# Patient Record
Sex: FEMALE | Race: BLACK OR AFRICAN AMERICAN | NOT HISPANIC OR LATINO | Employment: FULL TIME | URBAN - METROPOLITAN AREA
[De-identification: names, ages, dates, MRNs, and addresses within clinical notes are randomized per-mention and may not be internally consistent; named-entity substitution may affect disease eponyms.]

---

## 2023-06-26 ENCOUNTER — OFFICE VISIT (OUTPATIENT)
Dept: PRIMARY CARE | Facility: CLINIC | Age: 28
End: 2023-06-26
Payer: COMMERCIAL

## 2023-06-26 VITALS
HEART RATE: 79 BPM | HEIGHT: 67 IN | TEMPERATURE: 97.9 F | OXYGEN SATURATION: 99 % | BODY MASS INDEX: 19.15 KG/M2 | DIASTOLIC BLOOD PRESSURE: 70 MMHG | SYSTOLIC BLOOD PRESSURE: 110 MMHG | RESPIRATION RATE: 16 BRPM | WEIGHT: 122 LBS

## 2023-06-26 DIAGNOSIS — M62.838 NECK MUSCLE SPASM: Primary | ICD-10-CM

## 2023-06-26 DIAGNOSIS — M54.2 NECK PAIN ON RIGHT SIDE: ICD-10-CM

## 2023-06-26 PROCEDURE — 99213 OFFICE O/P EST LOW 20 MIN: CPT | Performed by: NURSE PRACTITIONER

## 2023-06-26 RX ORDER — IPRATROPIUM BROMIDE AND ALBUTEROL SULFATE 2.5; .5 MG/3ML; MG/3ML
3 SOLUTION RESPIRATORY (INHALATION)
COMMUNITY
Start: 2022-10-20

## 2023-06-26 RX ORDER — DICLOFENAC SODIUM 75 MG/1
75 TABLET, DELAYED RELEASE ORAL 2 TIMES DAILY PRN
Qty: 60 TABLET | Refills: 0 | Status: SHIPPED | OUTPATIENT
Start: 2023-06-26 | End: 2023-08-25

## 2023-06-26 RX ORDER — METHOCARBAMOL 500 MG/1
500 TABLET, FILM COATED ORAL 4 TIMES DAILY PRN
Qty: 40 TABLET | Refills: 0 | Status: SHIPPED | OUTPATIENT
Start: 2023-06-26 | End: 2024-03-19 | Stop reason: ALTCHOICE

## 2023-06-26 RX ORDER — ALBUTEROL SULFATE 0.83 MG/ML
2.5 SOLUTION RESPIRATORY (INHALATION) EVERY 8 HOURS PRN
COMMUNITY
Start: 2022-09-13

## 2023-06-26 RX ORDER — ALBUTEROL SULFATE 1.25 MG/3ML
1.25 SOLUTION RESPIRATORY (INHALATION) EVERY 8 HOURS PRN
COMMUNITY
Start: 2020-03-21

## 2023-06-26 RX ORDER — ALBUTEROL SULFATE 90 UG/1
2 AEROSOL, METERED RESPIRATORY (INHALATION) EVERY 4 HOURS PRN
COMMUNITY
Start: 2022-09-13 | End: 2023-12-28 | Stop reason: SDUPTHER

## 2023-06-26 ASSESSMENT — ENCOUNTER SYMPTOMS
NECK PAIN: 1
HEADACHES: 1

## 2023-06-26 NOTE — PROGRESS NOTES
Subjective   Patient ID: Taylor Downs is a 27 y.o. female who is with chief complaint of pain on the right side of her neck.    HPI  Patient is a 27 y.o. female who CONSULTED AT [facility] today. Patient is with complaints of pain the right side of her neck. She states that condition started about 1 week ago with pain and spasm on the left side of her neck. She denies injury nor any other probable source of symptoms. She states the pain on the left side of her neck disappeared but then this morning the pain and spasm is now on her right side of the neck. She denies any other symptoms. She has taken OTC analgesics but not much relief.     Review of Systems  General: no weight loss, generally healthy, no fatigue  Head:  no headaches / sinus pain, no vertigo, no injury  Eyes: no diplopia, no tearing, no pain,   Ears: no change in hearing, no tinnitus, no bleeding, no vertigo  Mouth:  no dental difficulties, no gingival bleeding, no sore throat, no loss of sense of taste  Nose: no congestion, no  discharge, no bleeding, no obstruction, no loss of sense of smell  Neck: (+) pain and spasm on the right side of the neck,  no masses, no bruit  Pulmonary: no dyspnea, no wheezing, no hemoptysis, no cough  Cardiovascular: no chest pain, no palpitations, no syncope, no orthopnea  Gastrointestinal: no change in appetite, no dysphagia, no abdominal pains, no diarrhea, no emesis, no melena  Genito Urinary: no dysuria, no urinary urgency, no nocturia, no incontinence, no change in nature of urine  Musculoskeletal: no muscle ache, no joint pain, no limitation of range of motion, no paresthesia, no numbness  Constitutional: no fever, no chills, no night sweats    Objective   Physical Exam  General: ambulatory, in no acute distress  Head: normocephalic, no lesions  Neck: (+) direct tenderness on right side of the neck on the areas of the sternocleidomastoid and trapezius,  Musculoskeletal: no limitation of range  of motion, no paralysis, no deformity;   Extremities: full and equal peripheral pulses, no edema, < 2 seconds capillary refill on all toes    Assessment/Plan   Problem List Items Addressed This Visit    None  Visit Diagnoses       Neck muscle spasm    -  Primary    Relevant Medications    methocarbamol (Robaxin) 500 mg tablet    diclofenac (Voltaren) 75 mg EC tablet    Neck pain on right side        Relevant Medications    methocarbamol (Robaxin) 500 mg tablet    diclofenac (Voltaren) 75 mg EC tablet        DISCHARGE SUMMARY:   Patient was seen and examined. Diagnosis, treatment, treatment options, and possible complications of today's illness discussed and explained to patient. Patient to take medication/s associated with this visit.  Patient may use OTC menthol patches as needed for comfort. Advised stretching and warm up exercises as tolerated prior to more intense physical exertion / exercise. Reinforce stretching and exercises that strengthen core muscles.     Patient to come back in 4 - 7 days if needed for worsening symptoms. Patient verbalized understanding of plan of care.

## 2023-06-26 NOTE — PROGRESS NOTES
"Subjective   Patient ID: Taylor Downs is a 27 y.o. female who presents for Neck Pain.    Patient claims pain radiates down her back, but the left side don't hurt the right side is the worst. But patient claims it can radiate to either side.    Neck Pain   This is a new problem. The current episode started 1 to 4 weeks ago. The problem occurs constantly. The pain is associated with an unknown factor. The pain is present in the right side, occipital region and left side. The quality of the pain is described as shooting and aching. The pain is at a severity of 10/10. The pain is severe. The symptoms are aggravated by twisting, position and bending. The pain is Worse during the night. Stiffness is present All day. Associated symptoms include headaches. She has tried bed rest for the symptoms. The treatment provided no relief.        Review of Systems   Musculoskeletal:  Positive for neck pain.   Neurological:  Positive for headaches.       Objective   /70 (BP Location: Left arm, Patient Position: Sitting, BP Cuff Size: Adult)   Pulse 79   Temp 36.6 °C (97.9 °F) (Temporal)   Resp 16   Ht 1.689 m (5' 6.5\")   Wt 55.3 kg (122 lb)   SpO2 99%   BMI 19.40 kg/m²     Physical Exam    Assessment/Plan          "

## 2023-06-27 NOTE — PATIENT INSTRUCTIONS
DISCHARGE SUMMARY:   Patient was seen and examined. Diagnosis, treatment, treatment options, and possible complications of today's illness discussed and explained to patient. Patient to take medication/s associated with this visit.  Patient may use OTC menthol patches as needed for comfort. Advised stretching and warm up exercises as tolerated prior to more intense physical exertion / exercise. Reinforce stretching and exercises that strengthen core muscles.     Patient to come back in 4 - 7 days if needed for worsening symptoms. Patient verbalized understanding of plan of care.

## 2023-12-15 ENCOUNTER — OFFICE VISIT (OUTPATIENT)
Dept: PRIMARY CARE | Facility: CLINIC | Age: 28
End: 2023-12-15
Payer: COMMERCIAL

## 2023-12-15 VITALS
RESPIRATION RATE: 16 BRPM | HEIGHT: 67 IN | DIASTOLIC BLOOD PRESSURE: 67 MMHG | OXYGEN SATURATION: 98 % | HEART RATE: 89 BPM | BODY MASS INDEX: 19.53 KG/M2 | TEMPERATURE: 98.2 F | WEIGHT: 124.4 LBS | SYSTOLIC BLOOD PRESSURE: 112 MMHG

## 2023-12-15 DIAGNOSIS — R06.2 WHEEZING: ICD-10-CM

## 2023-12-15 DIAGNOSIS — J45.20 MILD INTERMITTENT ASTHMA WITHOUT COMPLICATION (HHS-HCC): Primary | ICD-10-CM

## 2023-12-15 DIAGNOSIS — R06.09 DYSPNEA ON EXERTION: ICD-10-CM

## 2023-12-15 PROCEDURE — 99213 OFFICE O/P EST LOW 20 MIN: CPT | Performed by: NURSE PRACTITIONER

## 2023-12-15 RX ORDER — PREDNISONE 10 MG/1
TABLET ORAL
Qty: 30 TABLET | Refills: 0 | Status: SHIPPED | OUTPATIENT
Start: 2023-12-15 | End: 2023-12-25

## 2023-12-15 ASSESSMENT — PATIENT HEALTH QUESTIONNAIRE - PHQ9
SUM OF ALL RESPONSES TO PHQ9 QUESTIONS 1 AND 2: 0
2. FEELING DOWN, DEPRESSED OR HOPELESS: NOT AT ALL
1. LITTLE INTEREST OR PLEASURE IN DOING THINGS: NOT AT ALL

## 2023-12-15 NOTE — PROGRESS NOTES
"Subjective   Patient ID: Taylor Downs is a 28 y.o. female who presents for Shortness of Breath.    HPI     Symptoms: shortness of breath,fatigue from treatments, heaviness on chest.    Length of symptoms: Sx started on Monday 12/11/2023    OTC: pt did some albuterol breathing treatments with mild help.    Related information:    Review of Systems    Objective   /67 Comment: auto  Pulse 89   Temp 36.8 °C (98.2 °F) (Temporal)   Resp 16   Ht 1.689 m (5' 6.5\")   Wt 56.4 kg (124 lb 6.4 oz)   SpO2 98%   BMI 19.78 kg/m²     Physical Exam    Assessment/Plan          "

## 2023-12-15 NOTE — PROGRESS NOTES
Subjective   Patient ID: Taylor Downs is a 28 y.o. female who is with chief complaint of shortness of breath.    HPI  Patient is a 28 y.o. female who CONSULTED AT [facility] today. Patient is with complaints of shortness of breath and fatigue. Patient is with her mother who helped provide information for HPI. Patient denies having nasal congestion, nasal discharge, headache / sinus pain, sore throat, cough, muscle ache, loss of sense of taste, loss of sense of smell, diarrhea, chills nor fever. Patient states that present condition started about 4 days ago. Patient denies history of recent travel, exposure to person/people who tested positive for COVID 19, nor exposure to person/people with flu like symptoms.    Review of Systems  General: no weight loss, generally healthy, (+) fatigue  Head:  no headaches / sinus pain, no vertigo, no injury  Eyes: no diplopia, no tearing, no pain,   Ears: no change in hearing, no tinnitus, no bleeding, no vertigo  Mouth:  no dental difficulties, no gingival bleeding, no sore throat, no loss of sense of taste  Nose: no congestion, no  discharge, no bleeding, no obstruction, no loss of sense of smell  Neck: no stiffness, no pain, no tenderness, no masses, no bruit  Pulmonary: (+) dyspnea on exertion, no wheezing, no hemoptysis, no cough  Cardiovascular: no chest pain, no palpitations, no syncope, no orthopnea  Gastrointestinal: no change in appetite, no dysphagia, no abdominal pains, no diarrhea, no emesis, no melena  Genito Urinary: no dysuria, no urinary urgency, no nocturia, no incontinence, no change in nature of urine  Musculoskeletal: no muscle ache, no joint pain, no limitation of range of motion, no paresthesia, no numbness  Constitutional: no fever, no chills, no night sweats    Objective   Physical Exam  General: ambulatory, in no acute distress  Head: normocephalic, no lesions, no sinus tenderness  Eyes: pink palpebral conjunctiva, anicteric sclerae,  PATRIZIA HENNING's full  Ears: clear external auditory canals, no ear discharge, no bleeding from the ears, tympanic membrane intact  Nose: normal looking nasal mucosa, no nasal discharge, no bleeding, no obstruction  Throat: no erythema, no exudate on posterior pharyngeal wall, no lesion  Neck: supple, no masses, no bruits, no CLADP  Chest: symmetrical chest expansion, no lagging, no retractions, (+) harsh breath sounds, (+) diffuse wheezing on both lung fields, no rales,   Extremities: full and equal peripheral pulses, no edema,     Assessment/Plan   Problem List Items Addressed This Visit    None  Visit Diagnoses         Codes    Mild intermittent asthma without complication    -  Primary J45.20    Relevant Medications    predniSONE (Deltasone) 10 mg tablet    Wheezing     R06.2    Relevant Medications    predniSONE (Deltasone) 10 mg tablet    Dyspnea on exertion     R06.09    Relevant Medications    predniSONE (Deltasone) 10 mg tablet        DISCHARGE SUMMARY:   Patient was seen and examined. Diagnosis, treatment, treatment options, and possible complications of today's illness discussed and explained to patient and her mother. Patient to take medication/s associated with this visit. Patient may continue taking her bronchodilator inhaler. Advised to increase oral fluid intake. Advised steam inhalation as needed to relieve congestion. Advised to come back if with worsening or persistent symptoms. Patient verbalized understanding of plan of care.    Patient to come back in 7 - 10 days if needed for worsening symptoms.           CHELSEA Parsons-CNP 12/15/23 5:34 PM

## 2023-12-17 ASSESSMENT — ENCOUNTER SYMPTOMS
LOSS OF SENSATION IN FEET: 0
OCCASIONAL FEELINGS OF UNSTEADINESS: 0
DEPRESSION: 0

## 2023-12-18 NOTE — PATIENT INSTRUCTIONS
DISCHARGE SUMMARY:   Patient was seen and examined. Diagnosis, treatment, treatment options, and possible complications of today's illness discussed and explained to patient and her mother. Patient to take medication/s associated with this visit. Patient may continue taking her bronchodilator inhaler. Advised to increase oral fluid intake. Advised steam inhalation as needed to relieve congestion. Advised to come back if with worsening or persistent symptoms. Patient verbalized understanding of plan of care.    Patient to come back in 7 - 10 days if needed for worsening symptoms.

## 2023-12-28 ENCOUNTER — OFFICE VISIT (OUTPATIENT)
Dept: PRIMARY CARE | Facility: CLINIC | Age: 28
End: 2023-12-28
Payer: COMMERCIAL

## 2023-12-28 VITALS
OXYGEN SATURATION: 95 % | SYSTOLIC BLOOD PRESSURE: 111 MMHG | DIASTOLIC BLOOD PRESSURE: 53 MMHG | RESPIRATION RATE: 16 BRPM | TEMPERATURE: 97.4 F | HEIGHT: 67 IN | BODY MASS INDEX: 19.49 KG/M2 | HEART RATE: 102 BPM | WEIGHT: 124.2 LBS

## 2023-12-28 DIAGNOSIS — J45.20 MILD INTERMITTENT ASTHMA WITHOUT COMPLICATION (HHS-HCC): Primary | ICD-10-CM

## 2023-12-28 DIAGNOSIS — R06.02 SHORTNESS OF BREATH: ICD-10-CM

## 2023-12-28 PROCEDURE — 99213 OFFICE O/P EST LOW 20 MIN: CPT | Performed by: NURSE PRACTITIONER

## 2023-12-28 RX ORDER — METHYLPREDNISOLONE 4 MG/1
TABLET ORAL
Qty: 21 TABLET | Refills: 0 | Status: SHIPPED | OUTPATIENT
Start: 2023-12-28 | End: 2024-01-04

## 2023-12-28 RX ORDER — ALBUTEROL SULFATE 90 UG/1
2 AEROSOL, METERED RESPIRATORY (INHALATION) EVERY 4 HOURS PRN
Qty: 54 G | Refills: 1 | Status: SHIPPED | OUTPATIENT
Start: 2023-12-28

## 2023-12-28 ASSESSMENT — PATIENT HEALTH QUESTIONNAIRE - PHQ9
2. FEELING DOWN, DEPRESSED OR HOPELESS: NOT AT ALL
1. LITTLE INTEREST OR PLEASURE IN DOING THINGS: NOT AT ALL
SUM OF ALL RESPONSES TO PHQ9 QUESTIONS 1 AND 2: 0

## 2023-12-28 ASSESSMENT — ENCOUNTER SYMPTOMS
HEADACHES: 0
WEAKNESS: 0
COUGH: 0
SHORTNESS OF BREATH: 1
CHEST TIGHTNESS: 1
CHILLS: 0
SINUS PRESSURE: 0
FATIGUE: 0
SORE THROAT: 0
WHEEZING: 0
NUMBNESS: 0
PALPITATIONS: 0
DIZZINESS: 0

## 2023-12-28 NOTE — PROGRESS NOTES
"Subjective   Patient ID: Taylor Downs is a 28 y.o. female who presents for Asthma.    HPI Patient is in office  with asthma flare up, Sx started 12/27/2023 pt tried her ventolin with no help.     28 year old female (PMH: asthma) presents today complaining of an asthma flare. She was seen on 12/15/2023 and treated with prednisone. She was feeling better, until yesterday, her symptoms returned. She is feeling short of breath and her chest is feeling heavy. She has been using her Ventolin with little relief. She denies any cough. No fever/chills. She denies smoking/vaping. She has been using ipratropium/albuterol as her maintenance medication.     Review of Systems   Constitutional:  Negative for chills and fatigue.   HENT:  Negative for congestion, sinus pressure and sore throat.    Respiratory:  Positive for chest tightness and shortness of breath. Negative for cough and wheezing.    Cardiovascular:  Negative for chest pain and palpitations.   Allergic/Immunologic: Positive for environmental allergies and food allergies.   Neurological:  Negative for dizziness, weakness, numbness and headaches.       Objective   /53 Comment: auto  Pulse 102   Temp 36.3 °C (97.4 °F) (Temporal)   Resp 16   Ht 1.689 m (5' 6.5\")   Wt 56.3 kg (124 lb 3.2 oz)   SpO2 95%   BMI 19.75 kg/m²     Physical Exam  Vitals and nursing note reviewed.   Constitutional:       General: She is not in acute distress.     Appearance: Normal appearance.   HENT:      Right Ear: Tympanic membrane normal.      Left Ear: Tympanic membrane normal.      Nose: No congestion.      Mouth/Throat:      Pharynx: No oropharyngeal exudate or posterior oropharyngeal erythema.   Eyes:      Conjunctiva/sclera: Conjunctivae normal.   Cardiovascular:      Rate and Rhythm: Normal rate and regular rhythm.      Heart sounds: Normal heart sounds.   Pulmonary:      Effort: Pulmonary effort is normal.      Breath sounds: Normal breath sounds. No wheezing, rhonchi or " rales.   Skin:     General: Skin is warm and dry.   Neurological:      Mental Status: She is alert.   Psychiatric:         Mood and Affect: Mood normal.         Behavior: Behavior normal.         Assessment/Plan   Problem List Items Addressed This Visit    None  Visit Diagnoses         Codes    Mild intermittent asthma without complication    -  Primary J45.20    Relevant Medications    methylPREDNISolone (Medrol Dospak) 4 mg tablets    Ventolin HFA 90 mcg/actuation inhaler    Body mass index (BMI) of 19.0 to 19.9 in adult     Z68.1    Shortness of breath     R06.02        Will treat with Medrol dose galina, ventolin refilled today. [place] of Trelegy Ellipta (100mcg/62.5/25 mcg per blister) 1 puff daily for asthma maintenance treatment provided today. Follow up in 1 week for recheck, or sooner with any additional concerns. If any worsening symptoms, chest pains, trouble breathing, proceed to ER for further evaluation/treatment, or call 911.

## 2023-12-28 NOTE — PATIENT INSTRUCTIONS
Today you were seen for an asthma exacerbation.  Please start medications as directed.  Follow-up in 1 week for recheck, or sooner with any additional concerns.  If developing any worsening symptoms, chest pain or trouble breathing, proceed to emergency department or call 911.

## 2024-01-03 ASSESSMENT — ENCOUNTER SYMPTOMS
LEG PAIN: 0
ABDOMINAL PAIN: 0
CLAUDICATION: 0
WHEEZING: 1
NECK PAIN: 0
SWOLLEN GLANDS: 0
PND: 0
SYNCOPE: 0
SPUTUM PRODUCTION: 0
RHINORRHEA: 0
VOMITING: 0
FEVER: 0
ORTHOPNEA: 0
SHORTNESS OF BREATH: 1
HEMOPTYSIS: 0
SORE THROAT: 0
HEADACHES: 0

## 2024-01-04 ENCOUNTER — APPOINTMENT (OUTPATIENT)
Dept: CARDIOLOGY | Facility: HOSPITAL | Age: 29
End: 2024-01-04
Payer: COMMERCIAL

## 2024-01-04 ENCOUNTER — APPOINTMENT (OUTPATIENT)
Dept: PRIMARY CARE | Facility: CLINIC | Age: 29
End: 2024-01-04
Payer: COMMERCIAL

## 2024-01-04 ENCOUNTER — APPOINTMENT (OUTPATIENT)
Dept: RADIOLOGY | Facility: HOSPITAL | Age: 29
End: 2024-01-04
Payer: COMMERCIAL

## 2024-01-04 ENCOUNTER — HOSPITAL ENCOUNTER (EMERGENCY)
Facility: HOSPITAL | Age: 29
Discharge: HOME | End: 2024-01-04
Attending: EMERGENCY MEDICINE
Payer: COMMERCIAL

## 2024-01-04 VITALS
SYSTOLIC BLOOD PRESSURE: 108 MMHG | DIASTOLIC BLOOD PRESSURE: 76 MMHG | WEIGHT: 124 LBS | RESPIRATION RATE: 16 BRPM | HEART RATE: 79 BPM | TEMPERATURE: 97.5 F | HEIGHT: 66 IN | BODY MASS INDEX: 19.93 KG/M2 | OXYGEN SATURATION: 100 %

## 2024-01-04 DIAGNOSIS — G45.9 TIA (TRANSIENT ISCHEMIC ATTACK): Primary | ICD-10-CM

## 2024-01-04 DIAGNOSIS — T50.905A ADVERSE EFFECT OF DRUG, INITIAL ENCOUNTER: ICD-10-CM

## 2024-01-04 LAB
ALBUMIN SERPL BCP-MCNC: 3.6 G/DL (ref 3.4–5)
ALP SERPL-CCNC: 55 U/L (ref 33–110)
ALT SERPL W P-5'-P-CCNC: 7 U/L (ref 7–45)
ANION GAP SERPL CALC-SCNC: 11 MMOL/L (ref 10–20)
APTT PPP: 27 SECONDS (ref 27–38)
AST SERPL W P-5'-P-CCNC: 9 U/L (ref 9–39)
ATRIAL RATE: 66 BPM
BASOPHILS # BLD AUTO: 0.05 X10*3/UL (ref 0–0.1)
BASOPHILS NFR BLD AUTO: 0.4 %
BILIRUB SERPL-MCNC: 0.9 MG/DL (ref 0–1.2)
BUN SERPL-MCNC: 12 MG/DL (ref 6–23)
CALCIUM SERPL-MCNC: 8.2 MG/DL (ref 8.6–10.3)
CARDIAC TROPONIN I PNL SERPL HS: <3 NG/L (ref 0–13)
CHLORIDE SERPL-SCNC: 101 MMOL/L (ref 98–107)
CO2 SERPL-SCNC: 24 MMOL/L (ref 21–32)
CREAT SERPL-MCNC: 0.68 MG/DL (ref 0.5–1.05)
EOSINOPHIL # BLD AUTO: 0.08 X10*3/UL (ref 0–0.7)
EOSINOPHIL NFR BLD AUTO: 0.7 %
ERYTHROCYTE [DISTWIDTH] IN BLOOD BY AUTOMATED COUNT: 12.4 % (ref 11.5–14.5)
FLUAV RNA RESP QL NAA+PROBE: NOT DETECTED
FLUBV RNA RESP QL NAA+PROBE: NOT DETECTED
GFR SERPL CREATININE-BSD FRML MDRD: >90 ML/MIN/1.73M*2
GLUCOSE BLD MANUAL STRIP-MCNC: 111 MG/DL (ref 74–99)
GLUCOSE SERPL-MCNC: 96 MG/DL (ref 74–99)
HCT VFR BLD AUTO: 32.9 % (ref 36–46)
HGB BLD-MCNC: 11 G/DL (ref 12–16)
HOLD SPECIMEN: NORMAL
IMM GRANULOCYTES # BLD AUTO: 0.08 X10*3/UL (ref 0–0.7)
IMM GRANULOCYTES NFR BLD AUTO: 0.7 % (ref 0–0.9)
INR PPP: 1 (ref 0.9–1.1)
LYMPHOCYTES # BLD AUTO: 2.12 X10*3/UL (ref 1.2–4.8)
LYMPHOCYTES NFR BLD AUTO: 18.2 %
MCH RBC QN AUTO: 30.6 PG (ref 26–34)
MCHC RBC AUTO-ENTMCNC: 33.4 G/DL (ref 32–36)
MCV RBC AUTO: 92 FL (ref 80–100)
MONOCYTES # BLD AUTO: 0.59 X10*3/UL (ref 0.1–1)
MONOCYTES NFR BLD AUTO: 5.1 %
NEUTROPHILS # BLD AUTO: 8.75 X10*3/UL (ref 1.2–7.7)
NEUTROPHILS NFR BLD AUTO: 74.9 %
NRBC BLD-RTO: 0 /100 WBCS (ref 0–0)
P AXIS: 53 DEGREES
P OFFSET: 196 MS
P ONSET: 147 MS
PLATELET # BLD AUTO: 233 X10*3/UL (ref 150–450)
POTASSIUM SERPL-SCNC: 3.9 MMOL/L (ref 3.5–5.3)
PR INTERVAL: 148 MS
PROT SERPL-MCNC: 5.9 G/DL (ref 6.4–8.2)
PROTHROMBIN TIME: 11.5 SECONDS (ref 9.8–12.8)
Q ONSET: 221 MS
QRS COUNT: 11 BEATS
QRS DURATION: 82 MS
QT INTERVAL: 362 MS
QTC CALCULATION(BAZETT): 379 MS
QTC FREDERICIA: 374 MS
R AXIS: 70 DEGREES
RBC # BLD AUTO: 3.59 X10*6/UL (ref 4–5.2)
SARS-COV-2 RNA RESP QL NAA+PROBE: NOT DETECTED
SODIUM SERPL-SCNC: 132 MMOL/L (ref 136–145)
T AXIS: 52 DEGREES
T OFFSET: 402 MS
VENTRICULAR RATE: 66 BPM
WBC # BLD AUTO: 11.7 X10*3/UL (ref 4.4–11.3)

## 2024-01-04 PROCEDURE — 96361 HYDRATE IV INFUSION ADD-ON: CPT

## 2024-01-04 PROCEDURE — 84484 ASSAY OF TROPONIN QUANT: CPT | Performed by: EMERGENCY MEDICINE

## 2024-01-04 PROCEDURE — 70496 CT ANGIOGRAPHY HEAD: CPT | Performed by: RADIOLOGY

## 2024-01-04 PROCEDURE — 96374 THER/PROPH/DIAG INJ IV PUSH: CPT

## 2024-01-04 PROCEDURE — 36415 COLL VENOUS BLD VENIPUNCTURE: CPT | Performed by: EMERGENCY MEDICINE

## 2024-01-04 PROCEDURE — 82947 ASSAY GLUCOSE BLOOD QUANT: CPT

## 2024-01-04 PROCEDURE — 85610 PROTHROMBIN TIME: CPT | Performed by: EMERGENCY MEDICINE

## 2024-01-04 PROCEDURE — 2550000001 HC RX 255 CONTRASTS: Performed by: EMERGENCY MEDICINE

## 2024-01-04 PROCEDURE — 85025 COMPLETE CBC W/AUTO DIFF WBC: CPT | Performed by: EMERGENCY MEDICINE

## 2024-01-04 PROCEDURE — 93005 ELECTROCARDIOGRAM TRACING: CPT

## 2024-01-04 PROCEDURE — 85730 THROMBOPLASTIN TIME PARTIAL: CPT | Performed by: EMERGENCY MEDICINE

## 2024-01-04 PROCEDURE — 70496 CT ANGIOGRAPHY HEAD: CPT

## 2024-01-04 PROCEDURE — 70498 CT ANGIOGRAPHY NECK: CPT | Performed by: RADIOLOGY

## 2024-01-04 PROCEDURE — 70498 CT ANGIOGRAPHY NECK: CPT

## 2024-01-04 PROCEDURE — 70450 CT HEAD/BRAIN W/O DYE: CPT

## 2024-01-04 PROCEDURE — 87636 SARSCOV2 & INF A&B AMP PRB: CPT | Performed by: EMERGENCY MEDICINE

## 2024-01-04 PROCEDURE — 99285 EMERGENCY DEPT VISIT HI MDM: CPT | Performed by: EMERGENCY MEDICINE

## 2024-01-04 PROCEDURE — 84075 ASSAY ALKALINE PHOSPHATASE: CPT | Performed by: EMERGENCY MEDICINE

## 2024-01-04 PROCEDURE — 70450 CT HEAD/BRAIN W/O DYE: CPT | Mod: 59

## 2024-01-04 PROCEDURE — 71045 X-RAY EXAM CHEST 1 VIEW: CPT | Mod: FOREIGN READ | Performed by: RADIOLOGY

## 2024-01-04 PROCEDURE — 71045 X-RAY EXAM CHEST 1 VIEW: CPT

## 2024-01-04 PROCEDURE — 2500000004 HC RX 250 GENERAL PHARMACY W/ HCPCS (ALT 636 FOR OP/ED): Performed by: EMERGENCY MEDICINE

## 2024-01-04 RX ORDER — ONDANSETRON HYDROCHLORIDE 2 MG/ML
4 INJECTION, SOLUTION INTRAVENOUS ONCE
Status: COMPLETED | OUTPATIENT
Start: 2024-01-04 | End: 2024-01-04

## 2024-01-04 RX ADMIN — IOHEXOL 75 ML: 350 INJECTION, SOLUTION INTRAVENOUS at 09:44

## 2024-01-04 RX ADMIN — ONDANSETRON 4 MG: 2 INJECTION INTRAMUSCULAR; INTRAVENOUS at 10:55

## 2024-01-04 RX ADMIN — SODIUM CHLORIDE 500 ML: 9 INJECTION, SOLUTION INTRAVENOUS at 10:56

## 2024-01-04 ASSESSMENT — PAIN - FUNCTIONAL ASSESSMENT
PAIN_FUNCTIONAL_ASSESSMENT: 0-10

## 2024-01-04 ASSESSMENT — ABCD2 SCORE
CLINICAL FEATURES OF THE TIA: UNILATERAL WEAKNESS
HISTORY OF DIABETES: NO
BP IS HIGHER THAN 140/90 MMHG: NO
DURATION OF SYMPTOMS: 10-59
AGE IS GREATER THAN OR EQUAL TO 60: NO
ABCD2 SCORE: 3

## 2024-01-04 ASSESSMENT — LIFESTYLE VARIABLES
EVER FELT BAD OR GUILTY ABOUT YOUR DRINKING: NO
HAVE PEOPLE ANNOYED YOU BY CRITICIZING YOUR DRINKING: NO
EVER HAD A DRINK FIRST THING IN THE MORNING TO STEADY YOUR NERVES TO GET RID OF A HANGOVER: NO
REASON UNABLE TO ASSESS: NO
HAVE YOU EVER FELT YOU SHOULD CUT DOWN ON YOUR DRINKING: NO

## 2024-01-04 ASSESSMENT — PAIN SCALES - GENERAL
PAINLEVEL_OUTOF10: 0 - NO PAIN

## 2024-01-04 ASSESSMENT — COLUMBIA-SUICIDE SEVERITY RATING SCALE - C-SSRS
1. IN THE PAST MONTH, HAVE YOU WISHED YOU WERE DEAD OR WISHED YOU COULD GO TO SLEEP AND NOT WAKE UP?: NO
6. HAVE YOU EVER DONE ANYTHING, STARTED TO DO ANYTHING, OR PREPARED TO DO ANYTHING TO END YOUR LIFE?: NO
2. HAVE YOU ACTUALLY HAD ANY THOUGHTS OF KILLING YOURSELF?: NO

## 2024-01-04 NOTE — ED PROVIDER NOTES
HPI   Chief Complaint   Patient presents with    stroke alert     Pt awoke with right sided weakness since 3 am       History/Exam limitations: none.   Additional history was obtained from patient and spouse/SO.    HPI:       Taylor Downs is a 28 y.o. with a history of asthma and endometriosis who presents to the emergency department with possible strokelike symptoms.  The patient shares that around 3 AM this morning she started with some shortness of breath which she thought was related to her asthma.  Therefore she took some of her breathing treatments.  Her shortness of breath improved.  However, she noticed that she was having some difficulty with her thought process and then noticed that she had some weakness to her right arm and right leg.  She also states she had some slurred speech.  This seems to have improved.  No chest pain or shortness of breath at this time.  No back or flank pain.  No abdominal pain.  She denies any chance of pregnancy.  No fever, chills or cough.  No seizure-like activity.  No loss of motor or sensory function otherwise.  No loss of bladder or bowel function.  No history of similar.  No trauma or travel.  No sick contacts.  No treatment prior to arrival.   Last Known Well Time: 0300        ------------------------------------------------------------------------------------------------------------------------------------------     Physical Exam:    ED Triage Vitals  Temp: n/a  Pulse: n/a  Resp: n/a  BP: n/a  SpO2: n/a  Temp src: n/a  Heart Rate Source: n/a  Patient Position: n/a  BP Location: n/a  FiO2 (%): n/a     Gen: Not in acute distress  Head/Neck: NCAT  Eyes: Anicteric sclerae, noninjected conjunctivae  Nose: No rhinorrhea  Mouth:  MMM  Heart: Regular rate and rhythm w/ no murmurs, rubs, gallops  Lungs: CTAB w/o wheezes, rales, rhonchi, no increased work of breathing  Abdomen: Soft, NT/ND  Musculoskeletal: No deformities  Extremities: No edema.  Neurologic: Please see below for  NIHSS; grossly intact  Skin: No rashes noted  Psychological: Calm        Interval: Baseline  Time: 9:39 AM  Person Administering Scale: Julio Maxwell MD     1a  Level of consciousness: 0=alert; keenly responsive  1b. LOC questions:  0=Performs both tasks correctly  1c. LOC commands: 0=Performs both tasks correctly  2.  Best Gaze: 0=normal  3. Visual: 0=No visual loss  4. Facial Palsy: 0=Normal symmetric movement  5a. Motor left arm: 0=No drift, limb holds 90 (or 45) degrees for full 10 seconds  5b.  Motor right arm: 0=No drift, limb holds 90 (or 45) degrees for full 10 seconds  6a. motor left le=No drift, limb holds 90 (or 45) degrees for full 10 seconds  6b  Motor right le=No drift, limb holds 90 (or 45) degrees for full 10 seconds  7. Limb Ataxia: 0=Absent  8.  Sensory: 0=Normal; no sensory loss  9. Best Language:  0=No aphasia, normal  10. Dysarthria: 0=Normal  11. Extinction and Inattention: 0=No abnormality    Total:   0        VAN: VAN: Negative     ------------------------------------------------------------------------------------------------------------------------------------------     Medical Decision Making:              Independent Interpretation of Studies: I independently interpreted the CT head and see No obvious evidence of intracranial hemorrhage    Chronic Medical Conditions Significantly Affecting Care: Asthma      IV Thrombolysis IV Thrombolysis Checklist        IV Thrombolysis Given: No; Thrombolysis contraindication reason: Time from Last Known Well (or stroke onset) is >4.5 hours         Procedure  [unfilled]             History provided by:  Patient and spouse      ABCD2 Score: 3               Sapna Coma Scale Score: 15         NIH Stroke Scale: 0          Patient History   Past Medical History:   Diagnosis Date    Contact with and (suspected) exposure to covid-19 2022    Exposure to COVID-19 virus    Irritant contact dermatitis, unspecified cause 2022    Irritant  contact dermatitis, unspecified trigger    Other specified conditions associated with female genital organs and menstrual cycle 03/30/2022    Pain of ovary    Personal history of diseases of the skin and subcutaneous tissue 04/22/2022    History of contact dermatitis    Personal history of diseases of the skin and subcutaneous tissue 04/22/2022    History of urticaria    Personal history of other diseases of the circulatory system 03/30/2022    History of Raynaud's syndrome    Personal history of other diseases of the female genital tract 03/30/2022    History of endometriosis    Personal history of other diseases of the respiratory system 03/30/2022    History of asthma     Past Surgical History:   Procedure Laterality Date    OTHER SURGICAL HISTORY  04/22/2022    No history of surgery     No family history on file.  Social History     Tobacco Use    Smoking status: Never    Smokeless tobacco: Never   Substance Use Topics    Alcohol use: Not on file    Drug use: Not on file       Physical Exam   ED Triage Vitals   Temp Pulse Resp BP   -- -- -- --      SpO2 Temp src Heart Rate Source Patient Position   -- -- -- --      BP Location FiO2 (%)     -- --           ED Course & MDM   Diagnoses as of 01/04/24 1226   TIA (transient ischemic attack)   Adverse effect of drug, initial encounter       Medical Decision Making  Medical Decision Making:    Differential Diagnoses Considered: Stroke, intracranial bleed, TIA, dissection, viral syndrome     EKG: My interpretation of EKG is normal sinus rhythm at 66 bpm with no acute ST-T changes    Labs Reviewed   CBC WITH AUTO DIFFERENTIAL - Abnormal       Result Value    WBC 11.7 (*)     nRBC 0.0      RBC 3.59 (*)     Hemoglobin 11.0 (*)     Hematocrit 32.9 (*)     MCV 92      MCH 30.6      MCHC 33.4      RDW 12.4      Platelets 233      Neutrophils % 74.9      Immature Granulocytes %, Automated 0.7      Lymphocytes % 18.2      Monocytes % 5.1      Eosinophils % 0.7      Basophils %  0.4      Neutrophils Absolute 8.75 (*)     Immature Granulocytes Absolute, Automated 0.08      Lymphocytes Absolute 2.12      Monocytes Absolute 0.59      Eosinophils Absolute 0.08      Basophils Absolute 0.05     COMPREHENSIVE METABOLIC PANEL - Abnormal    Glucose 96      Sodium 132 (*)     Potassium 3.9      Chloride 101      Bicarbonate 24      Anion Gap 11      Urea Nitrogen 12      Creatinine 0.68      eGFR >90      Calcium 8.2 (*)     Albumin 3.6      Alkaline Phosphatase 55      Total Protein 5.9 (*)     AST 9      Bilirubin, Total 0.9      ALT 7     POCT GLUCOSE - Abnormal    POCT Glucose 111 (*)    TROPONIN I, HIGH SENSITIVITY - Normal    Troponin I, High Sensitivity <3      Narrative:     Less than 99th percentile of normal range cutoff-  Female and children under 18 years old <14 ng/L; Male <21 ng/L: Negative  Repeat testing should be performed if clinically indicated.     Female and children under 18 years old 14-50 ng/L; Male 21-50 ng/L:  Consistent with possible cardiac damage and possible increased clinical   risk. Serial measurements may help to assess extent of myocardial damage.     >50 ng/L: Consistent with cardiac damage, increased clinical risk and  myocardial infarction. Serial measurements may help assess extent of   myocardial damage.      NOTE: Children less than 1 year old may have higher baseline troponin   levels and results should be interpreted in conjunction with the overall   clinical context.     NOTE: Troponin I testing is performed using a different   testing methodology at [facility] than at other   [facility]. Direct result comparisons should only   be made within the same method.   PROTIME-INR - Normal    Protime 11.5      INR 1.0     APTT - Normal    aPTT 27      Narrative:     The APTT is no longer used for monitoring Unfractionated Heparin Therapy. For monitoring Heparin Therapy, use the Heparin Assay.   SARS-COV-2 AND INFLUENZA A/B PCR - Normal    Flu A  Result Not Detected      Flu B Result Not Detected      Coronavirus 2019, PCR Not Detected      Narrative:     This assay has received FDA Emergency Use Authorization (EUA) and  is only authorized for the duration of time that circumstances exist to justify the authorization of the emergency use of in vitro diagnostic tests for the detection of SARS-CoV-2 virus and/or diagnosis of COVID-19 infection under section 564(b)(1) of the Act, 21 U.S.C. 360bbb-3(b)(1). Testing for SARS-CoV-2 is only recommended for patients who meet current clinical and/or epidemiological criteria as defined by federal, state, or local public health directives. This assay is an in vitro diagnostic nucleic acid amplification test for the qualitative detection of SARS-CoV-2, Influenza A, and Influenza B from nasopharyngeal specimens and has been validated for use at [facility]. Negative results do not preclude COVID-19 infections or Influenza A/B infections, and should not be used as the sole basis for diagnosis, treatment, or other management decisions. If Influenza A/B and RSV PCR results are negative, testing for Parainfluenza virus, Adenovirus and Metapneumovirus is routinely performed for [facility] pediatric oncology and intensive care inpatients, and is available on other patients by placing an add-on request.    GREEN TOP    Extra Tube Hold for add-ons.     GRAY TOP    Extra Tube Hold for add-ons.     POCT GLUCOSE METER       XR chest 1 view   Final Result   No acute cardiopulmonary abnormality.   Signed by Lacho De La Vega MD      CT angio brain attack head w IV contrast and post procedure   Final Result   The CT angiogram through the upper thorax demonstrates no significant   stenosis along the origins of right brachiocephalic artery, left   common carotid, or left subclavian artery. No significant stenosis   noted along the origins of the vertebral arteries.        The CT angiogram of the neck demonstrates no  significant stenosis   along the common carotid is common carotid bifurcations, or cervical   segments of the internal carotid arteries. No significant stenosis is   noted along the cervical segments of the vertebral arteries. There is   a left dominant vertebral artery.        The CT angiogram of the head demonstrates no significant stenosis   along the distal internal carotid arteries, distal vertebral   arteries, or basilar artery. No large vessel branch cutoffs of the   anterior cerebral arteries, middle cerebral arteries, or posterior   cerebral arteries are noted. No intracranial aneurysm is noted.             MACRO:   None        Signed by: Neil Arias 1/4/2024 10:03 AM   Dictation workstation:   [id number]      CT angio brain attack neck w IV contrast and post procedure   Final Result   The CT angiogram through the upper thorax demonstrates no significant   stenosis along the origins of right brachiocephalic artery, left   common carotid, or left subclavian artery. No significant stenosis   noted along the origins of the vertebral arteries.        The CT angiogram of the neck demonstrates no significant stenosis   along the common carotid is common carotid bifurcations, or cervical   segments of the internal carotid arteries. No significant stenosis is   noted along the cervical segments of the vertebral arteries. There is   a left dominant vertebral artery.        The CT angiogram of the head demonstrates no significant stenosis   along the distal internal carotid arteries, distal vertebral   arteries, or basilar artery. No large vessel branch cutoffs of the   anterior cerebral arteries, middle cerebral arteries, or posterior   cerebral arteries are noted. No intracranial aneurysm is noted.             MACRO:   None        Signed by: Neil Arias 1/4/2024 10:03 AM   Dictation workstation:   [id number]      CT brain attack head wo IV contrast   Final Result   No evidence of acute cortical infarct or  intracranial hemorrhage.        No evidence of intracranial hemorrhage or displaced skull fracture.        MACRO:   Lucita Salazar discussed the significance and urgency of this   critical finding by telephone with  ISACC MANCIA on 1/4/2024 at   9:37 am.  (**-RCF-**) Findings:  See findings.             Signed by: Lucita Salazar 1/4/2024 9:38 AM   Dictation workstation:   [id number]          Diagnostic testing considered:         Review of recent and relevant records:    ED Medication Administration:   ED Medication Administration from 01/04/2024 0917 to 01/04/2024 1226         Date/Time Order Dose Route Action Action by     01/04/2024 0944 EST iohexol (OMNIPaque) 350 mg iodine/mL solution 75 mL 75 mL intravenous Given BANG Osei     01/04/2024 1055 EST ondansetron (Zofran) injection 4 mg 4 mg intravenous Given Wilms, B     01/04/2024 1056 EST sodium chloride 0.9 % bolus 500 mL 500 mL intravenous New Bag Wilms, B     01/04/2024 1156 EST sodium chloride 0.9 % bolus 500 mL 0 mL intravenous Stopped Wilms, B            Prescription Medication Consideration/Given:     Social Determinants of Health Significantly Affecting Care:      Summary:    /76 (01/04/24 1222)    Temp      Pulse 79 (01/04/24 1222)   Resp 16 (01/04/24 1222)    SpO2 100 % (01/04/24 1222)      Abnormal Labs Reviewed   CBC WITH AUTO DIFFERENTIAL - Abnormal; Notable for the following components:       Result Value    WBC 11.7 (*)     RBC 3.59 (*)     Hemoglobin 11.0 (*)     Hematocrit 32.9 (*)     Neutrophils Absolute 8.75 (*)     All other components within normal limits   COMPREHENSIVE METABOLIC PANEL - Abnormal; Notable for the following components:    Sodium 132 (*)     Calcium 8.2 (*)     Total Protein 5.9 (*)     All other components within normal limits   POCT GLUCOSE - Abnormal; Notable for the following components:    POCT Glucose 111 (*)     All other components within normal limits     Diagnostic evaluation was completed.  Influenza  and COVID are negative.  High-sensitivity troponin was unremarkable so do not suspect acute coronary syndrome.  Metabolic panel was unremarkable except a slightly low sodium at 132.  Glucose and potassium were otherwise in the normal range.  Renal and liver function tests were also in the normal range.  INR is 1.0 so there is no evidence of coagulopathy.  CBC shows a slightly elevated white blood cell count of 11.7.  Exact etiology of this is unclear but I do not suspect overwhelming infectious process.  There is mild anemia with a hemoglobin of 11.0 but I do not suspect acute blood loss.  This is likely chronic in nature.  Chest x-ray shows no acute cardiopulmonary abnormality.  CT of the brain shows no evidence of acute cortical infarct or intracranial hemorrhage.  No evidence of intracranial hemorrhage or display skull fracture.  CT angio of the brain and neck shows no significant stenosis.  There is no intracranial aneurysm.    Repeat evaluation of the patient at 11:20 AM again reveals that the patient's NIH stroke scale is 0.  Her vital signs are stable.  She is not hypertensive.    I have reviewed the patient's history, physical exam, and test information with the patient´s physician,    Dr. Busch (neuro)               , who agrees to discharge the patient, and he/she will have short term follow up with the patient in the office.     I discussed the results and discharge plan with the patient and/or family/friend if present.  I emphasized the importance of follow up with the physician I referred them to in the timeframe recommended.  I explained reasons for the patient to return to the Emergency Department.  Questions were addressed.  The patient and/or family/friend expressed understanding.      At discharge, mom asked to speak to me.  She shared with me the patient was recently started on Trelegy and that the medication side effects listed are all part of the symptoms she described this morning.  Mom thinks  that this is more likely what triggered her symptoms as opposed to a TIA or stroke.  I shared with mom that that is entirely possible.  I recommended that if they would like they should talk to the doctor who prescribed that medication and that she could stop taking the medication in the meantime.  I did recommend that she still follow-up with neurology if she has any recurrence of symptoms.  My suspicion for a TIA or stroke is low in this patient.            Diagnoses as of 01/04/24 1226   TIA (transient ischemic attack)   Adverse effect of drug, initial encounter         Procedure  Procedures     Julio OCHOA MD  01/04/24 1157       Julio OCHOA MD  01/04/24 1226

## 2024-01-05 ENCOUNTER — OFFICE VISIT (OUTPATIENT)
Dept: PRIMARY CARE | Facility: CLINIC | Age: 29
End: 2024-01-05
Payer: COMMERCIAL

## 2024-01-05 VITALS
HEART RATE: 86 BPM | OXYGEN SATURATION: 98 % | RESPIRATION RATE: 16 BRPM | BODY MASS INDEX: 19.84 KG/M2 | WEIGHT: 126.4 LBS | TEMPERATURE: 97.9 F | DIASTOLIC BLOOD PRESSURE: 61 MMHG | HEIGHT: 67 IN | SYSTOLIC BLOOD PRESSURE: 93 MMHG

## 2024-01-05 DIAGNOSIS — J45.20 MILD INTERMITTENT ASTHMA WITHOUT COMPLICATION (HHS-HCC): Primary | ICD-10-CM

## 2024-01-05 DIAGNOSIS — T88.7XXA MEDICATION SIDE EFFECT: ICD-10-CM

## 2024-01-05 PROCEDURE — 99213 OFFICE O/P EST LOW 20 MIN: CPT | Performed by: NURSE PRACTITIONER

## 2024-01-05 RX ORDER — MONTELUKAST SODIUM 10 MG/1
10 TABLET ORAL NIGHTLY
Qty: 30 TABLET | Refills: 1 | Status: SHIPPED | OUTPATIENT
Start: 2024-01-05 | End: 2024-03-25 | Stop reason: SDUPTHER

## 2024-01-05 RX ORDER — PEN NEEDLE, DIABETIC 31 GX5/16"
1 NEEDLE, DISPOSABLE MISCELLANEOUS AS NEEDED
Qty: 1 EACH | Refills: 0 | Status: SHIPPED | OUTPATIENT
Start: 2024-01-05

## 2024-01-05 ASSESSMENT — ENCOUNTER SYMPTOMS
HEADACHES: 0
SINUS PAIN: 0
FEVER: 0
PALPITATIONS: 0
SHORTNESS OF BREATH: 0
DIZZINESS: 0
CHILLS: 0

## 2024-01-05 NOTE — PROGRESS NOTES
"Subjective   Patient ID: Taylor Downs is a 28 y.o. female who presents for No chief complaint on file..    HPI     Symptoms: Patient went to the ER yesterday 1/4/2024 due to side effects of trelegy ellipta causing confusion, slurred speech, trouble walking, loss of coordination, feeling unsteady, very stiff muscles, high fever, sweating tremors, arm and leg weakness and headache. She is doing a follow up.  Length of symptoms:  Sx started  1/4/2024 at 3am.  OTC: nothing    28-year-old female presents today to follow-up from an ER visit.  Patient states that approximately 3 AM on 1/4/2024, she woke up not feeling well.  She thought it was her asthma.  She did use her albuterol to see if her symptoms would improved.  She then noticed that she was having trouble speaking.  She was experiencing weakness on 1 side of her body and a tremor in her right arm.  She went to the emergency department where she had a thorough workup and was diagnosed with a TIA and a possible side effect to Trelegy that was started earlier that week. She has not taken Trelegy since then, and states that her symptoms are not completely resolved. Prior to the episode on 1/4/2024, she did feel that her breathing was improved.     Review of Systems   Constitutional:  Negative for chills and fever.   HENT:  Negative for congestion, ear pain, sinus pain and sore throat.    Respiratory:  Negative for cough and shortness of breath.    Cardiovascular:  Negative for chest pain and palpitations.   Gastrointestinal:  Negative for abdominal pain, constipation, diarrhea and vomiting.   Musculoskeletal:  Negative for myalgias.   Skin:  Negative for rash.   Neurological:  Negative for dizziness, tremors, weakness, light-headedness, numbness and headaches.       Objective   BP 93/61 Comment: auto  Pulse 86   Temp 36.6 °C (97.9 °F) (Temporal)   Resp 16   Ht 1.689 m (5' 6.5\")   Wt 57.3 kg (126 lb 6.4 oz)   SpO2 98%   BMI 20.10 kg/m²     Physical Exam  Vitals " and nursing note reviewed.   Constitutional:       General: She is not in acute distress.     Appearance: Normal appearance.   HENT:      Right Ear: Tympanic membrane normal.      Left Ear: Tympanic membrane normal.      Nose: No congestion.      Mouth/Throat:      Pharynx: No oropharyngeal exudate or posterior oropharyngeal erythema.   Eyes:      Extraocular Movements: Extraocular movements intact.      Conjunctiva/sclera: Conjunctivae normal.      Pupils: Pupils are equal, round, and reactive to light.   Cardiovascular:      Rate and Rhythm: Normal rate and regular rhythm.      Heart sounds: Normal heart sounds.   Pulmonary:      Effort: Pulmonary effort is normal.      Breath sounds: Normal breath sounds. No wheezing, rhonchi or rales.   Lymphadenopathy:      Cervical: No cervical adenopathy.   Skin:     General: Skin is warm and dry.      Capillary Refill: Capillary refill takes less than 2 seconds.   Neurological:      General: No focal deficit present.      Mental Status: She is alert and oriented to person, place, and time.      Cranial Nerves: No cranial nerve deficit.      Sensory: No sensory deficit.      Motor: No weakness.      Coordination: Coordination normal.      Gait: Gait normal.      Deep Tendon Reflexes: Reflexes normal.   Psychiatric:         Mood and Affect: Mood normal.         Behavior: Behavior normal.         Assessment/Plan   Problem List Items Addressed This Visit    None  Visit Diagnoses         Codes    Mild intermittent asthma without complication    -  Primary J45.20    Relevant Medications    montelukast (Singulair) 10 mg tablet    nebulizers misc    nebulizer accessories kit    Medication side effect     T88.7XXA          ER visit from 1/4/2023 reviewed. Patient is feeling much better today. Encouraged setting up appointment with neurology for recheck. Will stop Trelegy due to side effects. Discussed starting singulair. Patient and Mother state that her brother has taken with no  issues. Discussed black box warning and possible side effects of singulair. Follow up in 1 month for recheck, or sooner with any additional concerns. If any new or worsening symptoms, to ER/call 911.

## 2024-01-07 ASSESSMENT — ENCOUNTER SYMPTOMS
COUGH: 0
MYALGIAS: 0
TREMORS: 0
ABDOMINAL PAIN: 0
VOMITING: 0
DIARRHEA: 0
WEAKNESS: 0
CONSTIPATION: 0
NUMBNESS: 0
SORE THROAT: 0
LIGHT-HEADEDNESS: 0

## 2024-02-05 ENCOUNTER — TELEPHONE (OUTPATIENT)
Dept: PRIMARY CARE | Facility: CLINIC | Age: 29
End: 2024-02-05
Payer: COMMERCIAL

## 2024-02-05 NOTE — TELEPHONE ENCOUNTER
Patient call in stated insurance doesn't cover the nebulizer accessories kit.  Insurance will cover the Nebulizer Compressor Machine.    Please advised

## 2024-02-06 DIAGNOSIS — J45.20 MILD INTERMITTENT ASTHMA WITHOUT COMPLICATION (HHS-HCC): Primary | ICD-10-CM

## 2024-03-19 ENCOUNTER — OFFICE VISIT (OUTPATIENT)
Dept: NEUROLOGY | Facility: CLINIC | Age: 29
End: 2024-03-19
Payer: COMMERCIAL

## 2024-03-19 VITALS
WEIGHT: 130.6 LBS | DIASTOLIC BLOOD PRESSURE: 82 MMHG | HEART RATE: 84 BPM | HEIGHT: 66 IN | SYSTOLIC BLOOD PRESSURE: 112 MMHG | BODY MASS INDEX: 20.99 KG/M2

## 2024-03-19 DIAGNOSIS — R56.9 FOCAL SEIZURE (MULTI): Primary | ICD-10-CM

## 2024-03-19 PROCEDURE — G2211 COMPLEX E/M VISIT ADD ON: HCPCS | Performed by: STUDENT IN AN ORGANIZED HEALTH CARE EDUCATION/TRAINING PROGRAM

## 2024-03-19 PROCEDURE — 99205 OFFICE O/P NEW HI 60 MIN: CPT | Performed by: STUDENT IN AN ORGANIZED HEALTH CARE EDUCATION/TRAINING PROGRAM

## 2024-03-19 PROCEDURE — 1036F TOBACCO NON-USER: CPT | Performed by: STUDENT IN AN ORGANIZED HEALTH CARE EDUCATION/TRAINING PROGRAM

## 2024-03-19 PROCEDURE — 3008F BODY MASS INDEX DOCD: CPT | Performed by: STUDENT IN AN ORGANIZED HEALTH CARE EDUCATION/TRAINING PROGRAM

## 2024-03-19 RX ORDER — LEVETIRACETAM 500 MG/1
500 TABLET ORAL 2 TIMES DAILY
Qty: 60 TABLET | Refills: 5 | Status: SHIPPED | OUTPATIENT
Start: 2024-03-19 | End: 2025-03-19

## 2024-03-19 ASSESSMENT — PATIENT HEALTH QUESTIONNAIRE - PHQ9
2. FEELING DOWN, DEPRESSED OR HOPELESS: NOT AT ALL
SUM OF ALL RESPONSES TO PHQ9 QUESTIONS 1 & 2: 0
1. LITTLE INTEREST OR PLEASURE IN DOING THINGS: NOT AT ALL

## 2024-03-19 NOTE — PROGRESS NOTES
Antoine Downs is a right handed  28 y.o. year old female who presents with Transient Ischemic Attack (NPV tia). Patient is accompanied by: Guardian - mom  Visit type: new patient visit     On 1/3 she was initially in her usual state of health. Shortly after having sex around 3am, she had onset of shortness of breath which improved after breathing treatment. Later on she developed dizziness, followed by difficulty saying full sentences, and then had onset of involuntary movement of her right hand with clonic twitching in her fingers, followed by spread to her right proximal arm moments later. The twitching lasted for 8 minutes and afterwards the arm and leg were weak. The weakness lasted around 8 hours and then resolved.    Then 2 weeks later it occurred again, shortly after intercourse. This time it started with dizziness and mild difficulty finding words and then lesser degree of twitching in right arm. This lasted 3 minutes or so, followed by mild weakness.    She had a normal birth history, normal development, no hx of febrile seizures, head injuries, CNS infection, or surgery. Her MGM had seizures at a young age.    She works as a model and a .    There is no problem list on file for this patient.     Past Medical History:   Diagnosis Date    Contact with and (suspected) exposure to covid-19 06/17/2022    Exposure to COVID-19 virus    Irritant contact dermatitis, unspecified cause 04/22/2022    Irritant contact dermatitis, unspecified trigger    Other specified conditions associated with female genital organs and menstrual cycle 03/30/2022    Pain of ovary    Personal history of diseases of the skin and subcutaneous tissue 04/22/2022    History of contact dermatitis    Personal history of diseases of the skin and subcutaneous tissue 04/22/2022    History of urticaria    Personal history of other diseases of the circulatory system 03/30/2022    History of Raynaud's syndrome    Personal  "history of other diseases of the female genital tract 03/30/2022    History of endometriosis    Personal history of other diseases of the respiratory system 03/30/2022    History of asthma      Past Surgical History:   Procedure Laterality Date    OTHER SURGICAL HISTORY  04/22/2022    No history of surgery      Social History     Socioeconomic History    Marital status: Single     Spouse name: Not on file    Number of children: Not on file    Years of education: Not on file    Highest education level: Not on file   Occupational History    Not on file   Tobacco Use    Smoking status: Never    Smokeless tobacco: Never   Substance and Sexual Activity    Alcohol use: Not on file    Drug use: Not on file    Sexual activity: Not on file   Other Topics Concern    Not on file   Social History Narrative    Not on file     Social Determinants of Health     Financial Resource Strain: Not on file   Food Insecurity: Not on file   Transportation Needs: Not on file   Physical Activity: Not on file   Stress: Not on file   Social Connections: Not on file   Intimate Partner Violence: Not on file   Housing Stability: Not on file      No family history on file.   Patient Health Questionnaire-2 Score: 0          Review of Systems  All other system have been reviewed and are negative for complaint.    Vitals:    03/19/24 1557   BP: 112/82   BP Location: Right arm   Patient Position: Sitting   Pulse: 84   Weight: 59.2 kg (130 lb 9.6 oz)   Height: 1.676 m (5' 6\")     Objective   Neurological Exam  Mental Status  Awake, alert and oriented to person, place and time. Speech is normal. Language is fluent with no aphasia. Attention and concentration are normal.    Cranial Nerves  CN II: Visual fields full to confrontation.  CN III, IV, VI: Extraocular movements intact bilaterally. Normal saccades. Normal smooth pursuit. Normal lids and orbits bilaterally. Pupils equal round and reactive to light bilaterally.  CN V:  Right: Facial sensation is " normal.  Left: Facial sensation is normal on the left.  CN VII: Full and symmetric facial movement.  CN VIII: Hearing is normal.  CN IX, X: Palate elevates symmetrically  CN XI: Shoulder shrug strength is normal.  CN XII: Tongue midline without atrophy or fasciculations.    Motor  Normal muscle bulk throughout. No fasciculations present. Normal muscle tone. No abnormal involuntary movements.                                               Right                     Left   Shoulder abduction               5                          5  Elbow flexion                         5                          5  Elbow extension                    5                          5  Finger flexion                         5                          5  Finger extension                    5                          5  Finger abduction                    5                          5  Hip flexion                              5                          5  Knee flexion                           5                          5  Plantarflexion                         5                          5  Dorsiflexion                            5                          5    Sensory  Light touch is normal in upper and lower extremities. Normal vibration in distal lower extremities.     Reflexes                                            Right                      Left  Biceps                                 2+                         2+  Triceps                                2+                         2+  Patellar                                2+                         2+  Achilles                                2+                         2+  Right Plantar: downgoing  Left Plantar: downgoing    Coordination  Right: Finger-to-nose normal. Heel-to-shin normal.Left: Finger-to-nose normal. Heel-to-shin normal.    Gait  Casual gait is normal including stance, stride, and arm swing. Normal tandem gait. Romberg is absent.                          Hemoglobin A1C    Date Value Ref Range Status   03/30/2022 5.1 % Final     Comment:          Diagnosis of Diabetes-Adults   Non-Diabetic: < or = 5.6%   Increased risk for developing diabetes: 5.7-6.4%   Diagnostic of diabetes: > or = 6.5%  .       Monitoring of Diabetes                Age (y)     Therapeutic Goal (%)   Adults:          >18           <7.0   Pediatrics:    13-18           <7.5                   7-12           <8.0                   0- 6            7.5-8.5   American Diabetes Association. Diabetes Care 33(S1), Jan 2010.       Estimated Average Glucose   Date Value Ref Range Status   03/30/2022 100 MG/DL Final     TSH   Date Value Ref Range Status   03/30/2022 1.04 0.44 - 3.98 mIU/L Final     Comment:      TSH testing is performed using different testing    methodology at [facility] than at other    [facility]. Direct result comparisons should    only be made within the same method.             Assessment/Plan   Diagnoses and all orders for this visit:  Focal seizure (CMS/[facility])  -     EEG; Future  -     MR brain w and wo IV contrast; Future  -     levETIRAcetam (Keppra) 500 mg tablet; Take 1 tablet (500 mg) by mouth 2 times a day.  -     Referral to Neurology; Future    Taylor Downs is a 28 y.o. year old female with asthma here for evaluation of two events characterized by dizziness followed by aphasia and then right arm clonic activity and then Hardik's paralysis. I suspect these are focal seizures. She is hesitant to start medicine and her mom also asked for a second opinion because of concern with being labelled as having a chronic condition, so I have referred her and she may return afterwards.    We discussed the following plan today:   EEG (brainwave test)  MRI brain  After the EEG, start Keppra 500 mg twice a day  Epilepsy referral for second opinion  We discussed that I recommend avoiding any activity that might result in harm to yourself and/or others in the event of altered awareness and/or  loss of consciousness. These include (but are not limited to) driving, climbing ladders, swimming unsupervised, hot-tub bathing, cooking, operating heavy machinery; for at least 6 months seizure-free, or until you are re-evaluated by your neurologist.   Stop aspirin  Return in 6 months

## 2024-03-19 NOTE — PATIENT INSTRUCTIONS
Thank you for your visit today. You were seen by Dr. Bingham for focal seizures. If you have any questions or need to reach me, call my office at [phone].    We discussed the following plan today:   EEG (brainwave test)  MRI brain  After the EEG, start Keppra 500 mg twice a day  Epilepsy referral for second opinion  We discussed that I recommend avoiding any activity that might result in harm to yourself and/or others in the event of altered awareness and/or loss of consciousness. These include (but are not limited to) driving, climbing ladders, swimming unsupervised, hot-tub bathing, cooking, operating heavy machinery; for at least 6 months seizure-free, or until you are re-evaluated by your neurologist.   Stop aspirin  Return in 6 months

## 2024-03-25 ENCOUNTER — PATIENT MESSAGE (OUTPATIENT)
Dept: PRIMARY CARE | Facility: CLINIC | Age: 29
End: 2024-03-25

## 2024-03-25 DIAGNOSIS — J45.20 MILD INTERMITTENT ASTHMA WITHOUT COMPLICATION (HHS-HCC): ICD-10-CM

## 2024-03-25 RX ORDER — MONTELUKAST SODIUM 10 MG/1
10 TABLET ORAL NIGHTLY
Qty: 90 TABLET | Refills: 0 | Status: SHIPPED | OUTPATIENT
Start: 2024-03-25 | End: 2024-11-11 | Stop reason: SDUPTHER

## 2024-03-25 NOTE — TELEPHONE ENCOUNTER
From: Taylor Downs  To: Shawna Catherine, APRN-CNP  Sent: 3/25/2024 2:04 AM EDT  Subject: Request for Refill on [facility]     Happy Monday Shawna,    I hope you have been well. The new prescription for my Asthma has been going great! I’ve been feeling so much better taking it. I haven’t notice any side effects which is a plus. I would love to request a 90 day refill on it if possible. Thank you!     Kindly,  Taylor Downs

## 2024-04-03 ENCOUNTER — APPOINTMENT (OUTPATIENT)
Dept: NEUROLOGY | Facility: HOSPITAL | Age: 29
End: 2024-04-03
Payer: COMMERCIAL

## 2024-04-05 ENCOUNTER — APPOINTMENT (OUTPATIENT)
Dept: NEUROLOGY | Facility: HOSPITAL | Age: 29
End: 2024-04-05
Payer: COMMERCIAL

## 2024-04-10 ENCOUNTER — HOSPITAL ENCOUNTER (OUTPATIENT)
Dept: RADIOLOGY | Facility: CLINIC | Age: 29
Discharge: HOME | End: 2024-04-10
Payer: COMMERCIAL

## 2024-04-10 DIAGNOSIS — R56.9 FOCAL SEIZURE (MULTI): ICD-10-CM

## 2024-04-10 PROCEDURE — 70553 MRI BRAIN STEM W/O & W/DYE: CPT | Performed by: RADIOLOGY

## 2024-04-10 PROCEDURE — 2550000001 HC RX 255 CONTRASTS: Performed by: STUDENT IN AN ORGANIZED HEALTH CARE EDUCATION/TRAINING PROGRAM

## 2024-04-10 PROCEDURE — A9575 INJ GADOTERATE MEGLUMI 0.1ML: HCPCS | Performed by: STUDENT IN AN ORGANIZED HEALTH CARE EDUCATION/TRAINING PROGRAM

## 2024-04-10 PROCEDURE — 70553 MRI BRAIN STEM W/O & W/DYE: CPT

## 2024-04-10 RX ORDER — GADOTERATE MEGLUMINE 376.9 MG/ML
12 INJECTION INTRAVENOUS
Status: COMPLETED | OUTPATIENT
Start: 2024-04-10 | End: 2024-04-10

## 2024-04-10 RX ADMIN — GADOTERATE MEGLUMINE 12 ML: 376.9 INJECTION INTRAVENOUS at 09:03

## 2024-04-25 ENCOUNTER — HOSPITAL ENCOUNTER (OUTPATIENT)
Dept: NEUROLOGY | Facility: HOSPITAL | Age: 29
Discharge: HOME | End: 2024-04-25
Payer: COMMERCIAL

## 2024-04-25 DIAGNOSIS — R56.9 FOCAL SEIZURE (MULTI): ICD-10-CM

## 2024-04-25 PROCEDURE — 95816 EEG AWAKE AND DROWSY: CPT

## 2024-04-25 PROCEDURE — 95816 EEG AWAKE AND DROWSY: CPT | Performed by: STUDENT IN AN ORGANIZED HEALTH CARE EDUCATION/TRAINING PROGRAM

## 2024-05-01 ENCOUNTER — APPOINTMENT (OUTPATIENT)
Dept: NEUROLOGY | Facility: HOSPITAL | Age: 29
End: 2024-05-01
Payer: COMMERCIAL

## 2024-09-17 ENCOUNTER — APPOINTMENT (OUTPATIENT)
Dept: NEUROLOGY | Facility: CLINIC | Age: 29
End: 2024-09-17
Payer: COMMERCIAL

## 2024-09-25 ENCOUNTER — OFFICE VISIT (OUTPATIENT)
Dept: PRIMARY CARE | Facility: CLINIC | Age: 29
End: 2024-09-25
Payer: COMMERCIAL

## 2024-09-25 VITALS
HEART RATE: 94 BPM | TEMPERATURE: 98.2 F | DIASTOLIC BLOOD PRESSURE: 69 MMHG | BODY MASS INDEX: 19.74 KG/M2 | OXYGEN SATURATION: 99 % | SYSTOLIC BLOOD PRESSURE: 112 MMHG | RESPIRATION RATE: 16 BRPM | WEIGHT: 125.8 LBS | HEIGHT: 67 IN

## 2024-09-25 DIAGNOSIS — R06.09 DYSPNEA ON EXERTION: Primary | ICD-10-CM

## 2024-09-25 DIAGNOSIS — R06.2 WHEEZING: ICD-10-CM

## 2024-09-25 DIAGNOSIS — J30.89 ENVIRONMENTAL AND SEASONAL ALLERGIES: ICD-10-CM

## 2024-09-25 DIAGNOSIS — J45.20 MILD INTERMITTENT ASTHMA WITHOUT COMPLICATION (HHS-HCC): ICD-10-CM

## 2024-09-25 PROCEDURE — 99212 OFFICE O/P EST SF 10 MIN: CPT | Performed by: NURSE PRACTITIONER

## 2024-09-25 RX ORDER — PREDNISONE 10 MG/1
TABLET ORAL
Qty: 30 TABLET | Refills: 0 | Status: SHIPPED | OUTPATIENT
Start: 2024-09-25 | End: 2024-10-05

## 2024-09-25 RX ORDER — CETIRIZINE HYDROCHLORIDE 10 MG/1
10 TABLET ORAL DAILY
Qty: 30 TABLET | Refills: 0 | Status: SHIPPED | OUTPATIENT
Start: 2024-09-25 | End: 2024-12-24

## 2024-09-25 RX ORDER — ALBUTEROL SULFATE 90 UG/1
2 AEROSOL, METERED RESPIRATORY (INHALATION) EVERY 4 HOURS PRN
Qty: 54 G | Refills: 1 | Status: SHIPPED | OUTPATIENT
Start: 2024-09-25

## 2024-09-25 ASSESSMENT — PATIENT HEALTH QUESTIONNAIRE - PHQ9
1. LITTLE INTEREST OR PLEASURE IN DOING THINGS: NOT AT ALL
SUM OF ALL RESPONSES TO PHQ9 QUESTIONS 1 AND 2: 0
2. FEELING DOWN, DEPRESSED OR HOPELESS: NOT AT ALL

## 2024-09-25 NOTE — PROGRESS NOTES
"Subjective   Patient ID: Taylor Downs is a 29 y.o. female who presents for asthma flare up      Symptoms: asthma flare up in the plane, chest heaviness,   Length of symptoms: 2 days ago  OTC: inhaler with mild help  Related information:    HPI     Review of Systems    Objective   /69   Pulse 94   Temp 36.8 °C (98.2 °F)   Resp 16   Ht 1.689 m (5' 6.5\")   Wt 57.1 kg (125 lb 12.8 oz)   SpO2 99%   BMI 20.00 kg/m²     Physical Exam    Assessment/Plan          "

## 2024-09-25 NOTE — PROGRESS NOTES
"Subjective   Patient ID: Taylor Downs is a 29 y.o. female who is with complaint of \"asthma flare up\".    HPI  Patient is a 29 y.o. female who CONSULTED AT [facility] today. Patient is with symptoms of intermittent wheezing and shortness of breath on exertion. She states that about 2 days ago, she started to have flare up symptoms of her asthma. She states she has shortness of breath on exertion, and wheezing that come and goes with no apparent precipitating event. She states she had just had a vacation and she noticed that the plane ride today might have caused the wheezing today. She is using several asthma medications but the rescue inhaler is not helping today.    Review of Systems  General: no weight loss, generally healthy, no fatigue  Head:  no headaches / sinus pain, no vertigo, no injury  Eyes: no diplopia, no tearing, no pain,   Ears: no change in hearing, no tinnitus, no bleeding, no vertigo  Mouth:  no dental difficulties, no gingival bleeding, no sore throat, no loss of sense of taste  Nose: no congestion, no  discharge, no bleeding, no obstruction, no loss of sense of smell  Neck: no stiffness, no pain, no tenderness, no masses, no bruit  Pulmonary: (+) dyspnea on exertion, (+) intermittent wheezing, no hemoptysis, no cough  Cardiovascular: no chest pain, no palpitations, no syncope, no orthopnea  Gastrointestinal: no change in appetite, no dysphagia, no abdominal pains, no diarrhea, no emesis, no melena  Genito Urinary: no dysuria, no urinary urgency, no nocturia, no incontinence, no change in nature of urine  Musculoskeletal: no muscle ache, no joint pain, no limitation of range of motion, no paresthesia, no numbness  Constitutional: no fever, no chills, no night sweats    Objective   Physical Exam  General: ambulatory, in no acute distress  Head: normocephalic, no lesions  Eyes: pink palpebral conjunctiva, anicteric sclerae, PERRLA, EOM's full  Ears: clear external auditory " canals, no ear discharge, no bleeding from the ears, tympanic membrane intact  Nose: nasal mucosa normal, no nasal discharge, no bleeding, no obstruction  Throat: clear, no exudate, no lesions  Neck: supple, no masses, no bruits  Chest: symmetrical chest expansion, no lagging, no retractions, clear breath sounds, no rales, (+) occasional wheezes    Assessment/Plan   Problem List Items Addressed This Visit    None  Visit Diagnoses         Codes    Dyspnea on exertion    -  Primary R06.09    Relevant Medications    predniSONE (Deltasone) 10 mg tablet    Mild intermittent asthma without complication ([facility])     J45.20    Relevant Medications    Ventolin HFA 90 mcg/actuation inhaler    predniSONE (Deltasone) 10 mg tablet    Wheezing     R06.2    Relevant Medications    predniSONE (Deltasone) 10 mg tablet    Environmental and seasonal allergies     J30.89    Relevant Medications    cetirizine (ZyrTEC) 10 mg tablet        DISCHARGE SUMMARY:   Patient was seen and examined. Diagnosis, treatment, treatment options, and possible complications of today's illness discussed and explained to patient. Patient to take medication/s associated with this visit. Patient may also take OTC analgesic/antipyretic if needed for pain/fever. Advised to increase oral fluid intake. Advised to come back if with worsening or persistent symptoms. Patient verbalized understanding of plan of care.    Patient to come back in 7 - 10 days if needed for worsening symptoms.           CHELSEA Parsons-CNP 09/25/24 5:35 PM

## 2024-09-26 ASSESSMENT — PATIENT HEALTH QUESTIONNAIRE - PHQ9
1. LITTLE INTEREST OR PLEASURE IN DOING THINGS: NOT AT ALL
2. FEELING DOWN, DEPRESSED OR HOPELESS: NOT AT ALL
SUM OF ALL RESPONSES TO PHQ9 QUESTIONS 1 AND 2: 0

## 2024-09-26 ASSESSMENT — ENCOUNTER SYMPTOMS
DEPRESSION: 0
LOSS OF SENSATION IN FEET: 0
OCCASIONAL FEELINGS OF UNSTEADINESS: 0

## 2024-09-27 NOTE — PATIENT INSTRUCTIONS
DISCHARGE SUMMARY:   Patient was seen and examined. Diagnosis, treatment, treatment options, and possible complications of today's illness discussed and explained to patient. Patient to take medication/s associated with this visit. Patient may also take OTC analgesic/antipyretic if needed for pain/fever. Advised to increase oral fluid intake. Advised to come back if with worsening or persistent symptoms. Patient verbalized understanding of plan of care.    Patient to come back in 7 - 10 days if needed for worsening symptoms.

## 2024-11-11 RX ORDER — MONTELUKAST SODIUM 10 MG/1
10 TABLET ORAL NIGHTLY
Qty: 30 TABLET | Refills: 0 | Status: SHIPPED | OUTPATIENT
Start: 2024-11-11 | End: 2024-12-11

## 2024-11-11 RX ORDER — CETIRIZINE HYDROCHLORIDE 10 MG/1
10 TABLET ORAL DAILY
Qty: 30 TABLET | Refills: 0 | Status: SHIPPED | OUTPATIENT
Start: 2024-11-11 | End: 2024-12-11